# Patient Record
(demographics unavailable — no encounter records)

---

## 2024-11-27 NOTE — CONSULT LETTER
[Dear  ___] : Dear  [unfilled], [Consult Letter:] : I had the pleasure of evaluating your patient, [unfilled]. [Please see my note below.] : Please see my note below. [Consult Closing:] : Thank you very much for allowing me to participate in the care of this patient.  If you have any questions, please do not hesitate to contact me. [Sincerely,] : Sincerely, [FreeTextEntry3] : GERMAN Brown Certified Pediatric Nurse Practitioner  Pediatric Neurology  [facility]

## 2024-11-27 NOTE — ASSESSMENT
[FreeTextEntry1] : SHARRON is a 6-year-old girl with no PMHx. Presents to the office for difficulty concentrating and hyperactivity. Currently in 1st grade, ICT classroom part of gen ed setting. Academically performing on grade level. Her parents suspect excessive electronics/YouTube exposure which may be contributing to her short attention span.  Non-focal exam. Will plan to do workup to r/o ADHD using [place] forms and psychoeducational evaluation.

## 2024-11-27 NOTE — PLAN
[FreeTextEntry1] : - Obtain psychoeducational testing from school, letter provided.  - [place] questionnaires given for parent and teacher -  Discussed use of Omega 3 fish oil - Discussed use of medications as well as side effects if accommodations do not improve school performance - Limit YouTube exposure. Provide consistant structured environment. Continued positive reinforcement.  - Discussed potential benefits of behavioral therapy - Follow up 1 month to review [place] questionnaires as well as psychoeducational testing

## 2024-11-27 NOTE — HISTORY OF PRESENT ILLNESS
[FreeTextEntry1] : SHARRON is a 6 year old female here for initial evaluation of inattention.   Early development: SHARRON was born full term via NVD. she was discharged home with mother. she hit all early developmental milestones appropriately.  SHARRON started pre-school at age 4.     Educational assessment: Current Grade: 1st grade Current District: [place] [id number] - [facility] ED/Any Accommodations/ICT in place: ICT classroom part of gen ed setting. Academically performing on grade level. Reading >Math. Prek- - difficulty sitting still, keeping hands to self, calling out, trouble focusing. Current teacher concerns - doing well academically. However, doesn't always complete tasks, follow instructions, easily distracted, needs redirection. Gets out of chair. Does Better 1:1.    Home assessment: Lives at home w/ parents. Goes to after school program ~ 5pm.  Inattention: Very easily distracted, needing frequent redirection, difficulty with multi-step instructions Hyperactivity: Fidgety Impulsivity: Talking and interrupts when others are speaking, has a hard time waiting/impatient. Gets frustrated if things don't go her way. Mother gives her positive "talks" which help w/ emotions.  Homework: Mother helps w/ homework and also needs prompting to stay on task.   Social Concerns: Doesn't get along well w/ other kids. Easy to make friends. Hard time keeping friends. Likes to fool around and be silly. Father expresses kids in her class act more mature than SHARRON.  Sleep: sleeps well Eating: eats a varied diet   Co- morbidities: No concern for anxiety, depression, OCD Can be overly emotional and cry stating she misses mom in school.  Other health concerns: Denies staring, twitching, seizure or seizure-like activity.   ACTIVITIES/INTERESTS: Does soccer, karate and swimming.  Also goes to library and does free crafting classes.  Does well in swimming, but other classes will tend to be fidgety and disruptive.  Loses interest quickly.    FAMILY HISTORY: Family history of ADHD: Mother w/ similar s/s when younger. never dg.  Family history of anxiety or depression: denies Denies family history of cardiac conditions or early unexplained deaths.

## 2024-11-27 NOTE — PHYSICAL EXAM
[Well-appearing] : well-appearing [Normocephalic] : normocephalic [No dysmorphic facial features] : no dysmorphic facial features [Straight] : straight [No esperanza or dimples] : no esperanza or dimples [No deformities] : no deformities [Alert] : alert [Well related, good eye contact] : well related, good eye contact [Conversant] : conversant [Normal speech and language] : normal speech and language [Follows instructions well] : follows instructions well [Full extraocular movements] : full extraocular movements [No nystagmus] : no nystagmus [Normal facial sensation to light touch] : normal facial sensation to light touch [No facial asymmetry or weakness] : no facial asymmetry or weakness [Gross hearing intact] : gross hearing intact [Equal palate elevation] : equal palate elevation [Good shoulder shrug] : good shoulder shrug [Normal tongue movement] : normal tongue movement [Normal axial and appendicular muscle tone] : normal axial and appendicular muscle tone [Gets up on table without difficulty] : gets up on table without difficulty [No pronator drift] : no pronator drift [Normal finger tapping and fine finger movements] : normal finger tapping and fine finger movements [No abnormal involuntary movements] : no abnormal involuntary movements [5/5 strength in proximal and distal muscles of arms and legs] : 5/5 strength in proximal and distal muscles of arms and legs [Walks and runs well] : walks and runs well [Able to walk on heels] : able to walk on heels [Able to walk on toes] : able to walk on toes [Good walking balance] : good walking balance [Normal gait] : normal gait [de-identified] : No respiratory distress noted.

## 2025-01-07 NOTE — END OF VISIT
Attending Only [Time Spent: ___ minutes] : I have spent [unfilled] minutes of time on the encounter which excludes teaching and separately reported services.

## 2025-01-08 NOTE — CONSULT LETTER
[Dear  ___] : Dear  [unfilled], [Consult Letter:] : I had the pleasure of evaluating your patient, [unfilled]. [Please see my note below.] : Please see my note below. [Consult Closing:] : Thank you very much for allowing me to participate in the care of this patient.  If you have any questions, please do not hesitate to contact me. [Sincerely,] : Sincerely, [FreeTextEntry3] : GERMAN Brown Certified Pediatric Nurse Practitioner  Pediatric Neurology  [facility]

## 2025-01-08 NOTE — CONSULT LETTER
[Dear  ___] : Dear  [unfilled], [Consult Letter:] : I had the pleasure of evaluating your patient, [unfilled]. [Please see my note below.] : Please see my note below. [Consult Closing:] : Thank you very much for allowing me to participate in the care of this patient.  If you have any questions, please do not hesitate to contact me. [Sincerely,] : Sincerely, [FreeTextEntry3] : GERMAN Brown Certified Pediatric Nurse Practitioner  Pediatric Neurology  [facility]

## 2025-01-08 NOTE — PHYSICAL EXAM
[Well-appearing] : well-appearing [Normocephalic] : normocephalic [No dysmorphic facial features] : no dysmorphic facial features [Straight] : straight [No esperanza or dimples] : no esperanza or dimples [No deformities] : no deformities [Alert] : alert [Well related, good eye contact] : well related, good eye contact [Conversant] : conversant [Normal speech and language] : normal speech and language [Follows instructions well] : follows instructions well [Full extraocular movements] : full extraocular movements [No nystagmus] : no nystagmus [Normal facial sensation to light touch] : normal facial sensation to light touch [No facial asymmetry or weakness] : no facial asymmetry or weakness [Gross hearing intact] : gross hearing intact [Equal palate elevation] : equal palate elevation [Good shoulder shrug] : good shoulder shrug [Normal tongue movement] : normal tongue movement [Normal axial and appendicular muscle tone] : normal axial and appendicular muscle tone [Gets up on table without difficulty] : gets up on table without difficulty [No pronator drift] : no pronator drift [Normal finger tapping and fine finger movements] : normal finger tapping and fine finger movements [No abnormal involuntary movements] : no abnormal involuntary movements [5/5 strength in proximal and distal muscles of arms and legs] : 5/5 strength in proximal and distal muscles of arms and legs [Walks and runs well] : walks and runs well [Able to walk on heels] : able to walk on heels [Able to walk on toes] : able to walk on toes [Good walking balance] : good walking balance [Normal gait] : normal gait [de-identified] : No respiratory distress noted.

## 2025-01-08 NOTE — HISTORY OF PRESENT ILLNESS
[FreeTextEntry1] : SHARRON is a 6 year old female here for f/u evaluation of inattention. Currently in 1st grade, ICT classroom part of gen ed setting. Academically performing on grade level.   f/u 01/08/2025 [place] forms reviewed:   Parents responses: ~ Inattention 5/9- (6/9).  +Hyperactivity 7/9 (6/9) + ODD: 4/8. (4/8) 19-26  Conduct disorder: 0/14 (3/14) 27-40  Anxiety/ Depression: 1/7 (3/7) Overall performance: struggles mainly w/ relationship w/ peers.    Teachers responses:  +Inattention 6/9- (6/9). + Hyperactivity 7/9 (6/9)  ODD/ Conduct: 0/10. (3/10) 19-28  Anxiety/ Depression:  0/7 (3/7)  Overall Performance: struggling w/ relationship w/ peers, following directions, disrupting class, assignment completion and organizational skills.   Since last visit, she started taking fish oil, with observed improvement. Her father reports fewer negative reports from teachers, compared to more frequent daily complaints before the omega-3 supplementation.  Initial Visit: 11/27/2024 Early development: SHARRON was born full term via NVD. she was discharged home with mother. she hit all early developmental milestones appropriately.  SHARRON started pre-school at age 4.     Educational assessment: Current Grade: 1st grade Current District: [place] - [facility] ED/Any Accommodations/ICT in place: ICT classroom part of gen ed setting. Academically performing on grade level. Reading >Math. Prek- - difficulty sitting still, keeping hands to self, calling out, trouble focusing. Current teacher concerns - doing well academically. However, doesn't always complete tasks, follow instructions, easily distracted, needs redirection. Gets out of chair. Does Better 1:1.    Home assessment: Lives at home w/ parents. Goes to after school program ~ 5pm.  Inattention: Very easily distracted, needing frequent redirection, difficulty with multi-step instructions Hyperactivity: Fidgety Impulsivity: Talking and interrupts when others are speaking, has a hard time waiting/impatient. Gets frustrated if things don't go her way. Mother gives her positive "talks" which help w/ emotions.  Homework: Mother helps w/ homework and also needs prompting to stay on task.   Social Concerns: Doesn't get along well w/ other kids. Easy to make friends. Hard time keeping friends. Likes to fool around and be silly. Father expresses kids in her class act more mature than SHARRON.  Sleep: sleeps well Eating: eats a varied diet   Co- morbidities: No concern for anxiety, depression, OCD Can be overly emotional and cry stating she misses mom in school.  Other health concerns: Denies staring, twitching, seizure or seizure-like activity.   ACTIVITIES/INTERESTS: Does soccer, karate and swimming.  Also goes to library and does free crafting classes.  Does well in swimming, but other classes will tend to be fidgety and disruptive.  Loses interest quickly.    FAMILY HISTORY: Family history of ADHD: Mother w/ similar s/s when younger. never dg.  Family history of anxiety or depression: denies Denies family history of cardiac conditions or early unexplained deaths.

## 2025-01-08 NOTE — ASSESSMENT
[FreeTextEntry1] : SHARRON is a 6-year-old female, presented for follow-up regarding inattention. She is currently performing at grade level academically in a 1st-grade ICT classroom within a general education setting. Review of [place] forms indicates ongoing inattention and hyperactivity, consistent w/ ADHD, combined type. Parent responses suggest possible ODD. Since her last visit, she started fish oil supplementation, which her father reports has correlated with fewer negative reports from school and overall improvement. Non focal exam. Discussed with parents trial of stimulant for ADHD management in addition to school accommodations.  Parents in agreement with plan.  Will start Focalin XR 5mg. Side effects and benefits reviewed with parents including but not limited to appetite suppression, insomnia and worsening of anxiety.

## 2025-01-08 NOTE — PLAN
[FreeTextEntry1] : - + ADHD, combined. Letter for accommodations provided to parent.  -  Continue use of Omega 3 fish oil - Start Focalin XR 5mg. s/e discussed.  - Limit YouTube exposure. Provide consistent structured environment. Continued positive reinforcement.  - Follow up 1 month. Call sooner if any concerns.

## 2025-01-08 NOTE — ASSESSMENT
[FreeTextEntry1] : SHARRON is a 6-year-old female, presented for follow-up regarding inattention. She is currently performing at grade level academically in a 1st-grade ICT classroom within a general education setting. Review of [place] forms indicates ongoing inattention and hyperactivity, consistent w/ ADHD, combined type. Parent responses suggest possible ODD. Since her last visit, she started fish oil supplementation, which her father reports has correlated with fewer negative reports from school and overall improvement. Non focal exam. Discussed with parents trial of stimulant for ADHD management in addition to school accommodations.  Parents in agreement with plan.  Will start Focalin XR 5mg. Side effects and benefits reviewed with parents including but not limited to appetite suppression, insomnia and worsening of anxiety.

## 2025-01-08 NOTE — PHYSICAL EXAM
[Well-appearing] : well-appearing [Normocephalic] : normocephalic [No dysmorphic facial features] : no dysmorphic facial features [Straight] : straight [No esperanza or dimples] : no esperanza or dimples [No deformities] : no deformities [Alert] : alert [Well related, good eye contact] : well related, good eye contact [Conversant] : conversant [Normal speech and language] : normal speech and language [Follows instructions well] : follows instructions well [Full extraocular movements] : full extraocular movements [No nystagmus] : no nystagmus [Normal facial sensation to light touch] : normal facial sensation to light touch [No facial asymmetry or weakness] : no facial asymmetry or weakness [Gross hearing intact] : gross hearing intact [Equal palate elevation] : equal palate elevation [Good shoulder shrug] : good shoulder shrug [Normal tongue movement] : normal tongue movement [Normal axial and appendicular muscle tone] : normal axial and appendicular muscle tone [Gets up on table without difficulty] : gets up on table without difficulty [No pronator drift] : no pronator drift [Normal finger tapping and fine finger movements] : normal finger tapping and fine finger movements [No abnormal involuntary movements] : no abnormal involuntary movements [5/5 strength in proximal and distal muscles of arms and legs] : 5/5 strength in proximal and distal muscles of arms and legs [Walks and runs well] : walks and runs well [Able to walk on heels] : able to walk on heels [Able to walk on toes] : able to walk on toes [Good walking balance] : good walking balance [Normal gait] : normal gait [de-identified] : No respiratory distress noted.

## 2025-01-08 NOTE — HISTORY OF PRESENT ILLNESS
[FreeTextEntry1] : SHARRON is a 6 year old female here for f/u evaluation of inattention. Currently in 1st grade, ICT classroom part of gen ed setting. Academically performing on grade level.   f/u 01/08/2025 [place] forms reviewed:   Parents responses: ~ Inattention 5/9- (6/9).  +Hyperactivity 7/9 (6/9) + ODD: 4/8. (4/8) 19-26  Conduct disorder: 0/14 (3/14) 27-40  Anxiety/ Depression: 1/7 (3/7) Overall performance: struggles mainly w/ relationship w/ peers.    Teachers responses:  +Inattention 6/9- (6/9). + Hyperactivity 7/9 (6/9)  ODD/ Conduct: 0/10. (3/10) 19-28  Anxiety/ Depression:  0/7 (3/7)  Overall Performance: struggling w/ relationship w/ peers, following directions, disrupting class, assignment completion and organizational skills.   Since last visit, she started taking fish oil, with observed improvement. Her father reports fewer negative reports from teachers, compared to more frequent daily complaints before the omega-3 supplementation.  Initial Visit: 11/27/2024 Early development: SHARRON was born full term via NVD. she was discharged home with mother. she hit all early developmental milestones appropriately.  SHARRON started pre-school at age 4.     Educational assessment: Current Grade: 1st grade Current District: [place] - [facility] ED/Any Accommodations/ICT in place: ICT classroom part of gen ed setting. Academically performing on grade level. Reading >Math. Prek- - difficulty sitting still, keeping hands to self, calling out, trouble focusing. Current teacher concerns - doing well academically. However, doesn't always complete tasks, follow instructions, easily distracted, needs redirection. Gets out of chair. Does Better 1:1.    Home assessment: Lives at home w/ parents. Goes to after school program ~ 5pm.  Inattention: Very easily distracted, needing frequent redirection, difficulty with multi-step instructions Hyperactivity: Fidgety Impulsivity: Talking and interrupts when others are speaking, has a hard time waiting/impatient. Gets frustrated if things don't go her way. Mother gives her positive "talks" which help w/ emotions.  Homework: Mother helps w/ homework and also needs prompting to stay on task.   Social Concerns: Doesn't get along well w/ other kids. Easy to make friends. Hard time keeping friends. Likes to fool around and be silly. Father expresses kids in her class act more mature than SHARRON.  Sleep: sleeps well Eating: eats a varied diet   Co- morbidities: No concern for anxiety, depression, OCD Can be overly emotional and cry stating she misses mom in school.  Other health concerns: Denies staring, twitching, seizure or seizure-like activity.   ACTIVITIES/INTERESTS: Does soccer, karate and swimming.  Also goes to library and does free crafting classes.  Does well in swimming, but other classes will tend to be fidgety and disruptive.  Loses interest quickly.    FAMILY HISTORY: Family history of ADHD: Mother w/ similar s/s when younger. never dg.  Family history of anxiety or depression: denies Denies family history of cardiac conditions or early unexplained deaths.

## 2025-02-12 NOTE — REASON FOR VISIT
[Follow-Up Evaluation] : a follow-up evaluation for [ADHD] : ADHD [Patient] : patient [Parents] : parents [Medical Records] : medical records [Father] : father [Home] : at home, [unfilled] , at the time of the visit. [Medical Office: ([facility])___] : at the medical office located in  [Telehealth (audio & video)] : This visit was provided via telehealth using real-time 2-way audio visual technology. [Verbal consent obtained from patient] : the patient, [unfilled]

## 2025-02-12 NOTE — PLAN
[FreeTextEntry1] : - + ADHD, combined. -  Continue use of Omega 3 fish oil - Continue Focalin XR 5mg. s/e discussed. Refill sent to pharmacy.  - Follow up 2 month. Call sooner if any concerns.

## 2025-02-12 NOTE — CONSULT LETTER
[Dear  ___] : Dear  [unfilled], [Consult Letter:] : I had the pleasure of evaluating your patient, [unfilled]. [Please see my note below.] : Please see my note below. [Consult Closing:] : Thank you very much for allowing me to participate in the care of this patient.  If you have any questions, please do not hesitate to contact me. [Sincerely,] : Sincerely, [FreeTextEntry3] : GERMAN Brown Certified Pediatric Nurse Practitioner  Pediatric Neurology  [facility]

## 2025-02-12 NOTE — PHYSICAL EXAM
[Well-appearing] : well-appearing [No deformities] : no deformities [Alert] : alert [Well related, good eye contact] : well related, good eye contact [Conversant] : conversant [Normal speech and language] : normal speech and language [Follows instructions well] : follows instructions well [No abnormal involuntary movements] : no abnormal involuntary movements [de-identified] : limited due to tele visit.

## 2025-02-12 NOTE — HISTORY OF PRESENT ILLNESS
[FreeTextEntry1] : FATOU is a 6 year old female here for f/u ADHD, combined type.  Currently in 1st grade, ICT classroom part of gen ed setting. Academically performing on grade level.  Last visit, started on Focalin xr 5mg.  f/u 02/12/2025 Since starting Focalin XR 5mg (primarily on school days), Fatou has shown significant improvement academically, demonstrating better focus and improved peer relationships. Teachers report increased engagement and sociability, a contrast to her previous inattentiveness. Excessive talking and shouting have decreased. While a decreased appetite is noted, there is no weight loss, and appetite increases after school. Increased emotional lability (fussiness, crying) is also observed in the evenings when tired, but sleep itself is reportedly good. Efforts are being made to limit screen time, especially on school nights.  f/u 01/08/2025 [place] forms reviewed:   Parents responses: ~ Inattention 5/9- (6/9).  +Hyperactivity 7/9 (6/9) + ODD: 4/8. (4/8) 19-26  Conduct disorder: 0/14 (3/14) 27-40  Anxiety/ Depression: 1/7 (3/7) Overall performance: struggles mainly w/ relationship w/ peers.    Teachers responses:  +Inattention 6/9- (6/9). + Hyperactivity 7/9 (6/9)  ODD/ Conduct: 0/10. (3/10) 19-28  Anxiety/ Depression:  0/7 (3/7)  Overall Performance: struggling w/ relationship w/ peers, following directions, disrupting class, assignment completion and organizational skills.   Since last visit, she started taking fish oil, with observed improvement. Her father reports fewer negative reports from teachers, compared to more frequent daily complaints before the omega-3 supplementation.  Initial Visit: 11/27/2024 Early development: FATOU was born full term via NVD. she was discharged home with mother. she hit all early developmental milestones appropriately.  FATOU started pre-school at age 4.     Educational assessment: Current Grade: 1st grade Current District: [place] [id number] - [facility] ED/Any Accommodations/ICT in place: ICT classroom part of gen ed setting. Academically performing on grade level. Reading >Math. Prek- - difficulty sitting still, keeping hands to self, calling out, trouble focusing. Current teacher concerns - doing well academically. However, doesn't always complete tasks, follow instructions, easily distracted, needs redirection. Gets out of chair. Does Better 1:1.    Home assessment: Lives at home w/ parents. Goes to after school program ~ 5pm.  Inattention: Very easily distracted, needing frequent redirection, difficulty with multi-step instructions Hyperactivity: Fidgety Impulsivity: Talking and interrupts when others are speaking, has a hard time waiting/impatient. Gets frustrated if things don't go her way. Mother gives her positive "talks" which help w/ emotions.  Homework: Mother helps w/ homework and also needs prompting to stay on task.   Social Concerns: Doesn't get along well w/ other kids. Easy to make friends. Hard time keeping friends. Likes to fool around and be silly. Father expresses kids in her class act more mature than FATOU.  Sleep: sleeps well Eating: eats a varied diet   Co- morbidities: No concern for anxiety, depression, OCD Can be overly emotional and cry stating she misses mom in school.  Other health concerns: Denies staring, twitching, seizure or seizure-like activity.   ACTIVITIES/INTERESTS: Does soccer, karate and swimming.  Also goes to library and does free crafting classes.  Does well in swimming, but other classes will tend to be fidgety and disruptive.  Loses interest quickly.    FAMILY HISTORY: Family history of ADHD: Mother w/ similar s/s when younger. never dg.  Family history of anxiety or depression: denies Denies family history of cardiac conditions or early unexplained deaths.

## 2025-02-12 NOTE — ASSESSMENT
[FreeTextEntry1] : SHARRON is a 5yo female, presented for follow-up regarding her ADHD, combined type. She is currently in 1st grade, attending an ICT classroom within a general education setting, and performing at grade level academically. Since her last visit, she began taking Focalin XR 5mg (primarily on school days), resulting in significant improvements in academics, focus, and peer relationships.